# Patient Record
Sex: FEMALE | URBAN - NONMETROPOLITAN AREA
[De-identification: names, ages, dates, MRNs, and addresses within clinical notes are randomized per-mention and may not be internally consistent; named-entity substitution may affect disease eponyms.]

---

## 2021-06-03 ENCOUNTER — HOSPITAL ENCOUNTER (OUTPATIENT)
Age: 41
Setting detail: SPECIMEN
Discharge: HOME | End: 2021-06-03

## 2025-04-28 ENCOUNTER — OFFICE VISIT (OUTPATIENT)
Dept: GYNECOLOGIC ONCOLOGY | Facility: CLINIC | Age: 45
End: 2025-04-28
Payer: MEDICAID

## 2025-04-28 VITALS
DIASTOLIC BLOOD PRESSURE: 87 MMHG | WEIGHT: 206.35 LBS | BODY MASS INDEX: 37.97 KG/M2 | HEIGHT: 62 IN | OXYGEN SATURATION: 97 % | SYSTOLIC BLOOD PRESSURE: 129 MMHG | RESPIRATION RATE: 16 BRPM | TEMPERATURE: 97.5 F | HEART RATE: 78 BPM

## 2025-04-28 DIAGNOSIS — N83.8 OVARIAN MASS: Primary | ICD-10-CM

## 2025-04-28 PROCEDURE — 3008F BODY MASS INDEX DOCD: CPT | Performed by: OBSTETRICS & GYNECOLOGY

## 2025-04-28 PROCEDURE — 99205 OFFICE O/P NEW HI 60 MIN: CPT | Performed by: OBSTETRICS & GYNECOLOGY

## 2025-04-28 PROCEDURE — 99215 OFFICE O/P EST HI 40 MIN: CPT | Performed by: OBSTETRICS & GYNECOLOGY

## 2025-04-28 RX ORDER — BIOTIN 1 MG
1000 TABLET ORAL 2 TIMES DAILY
COMMUNITY

## 2025-04-28 RX ORDER — METOCLOPRAMIDE 5 MG/1
5 TABLET ORAL 2 TIMES DAILY PRN
COMMUNITY
Start: 2025-01-24

## 2025-04-28 RX ORDER — OXYCODONE HYDROCHLORIDE 5 MG/1
5 TABLET ORAL 2 TIMES DAILY PRN
COMMUNITY
Start: 2025-04-17

## 2025-04-28 RX ORDER — SERTRALINE HYDROCHLORIDE 100 MG/1
100 TABLET, FILM COATED ORAL
COMMUNITY
Start: 2025-04-16 | End: 2026-04-16

## 2025-04-28 RX ORDER — LORAZEPAM 0.5 MG/1
0.5 TABLET ORAL
COMMUNITY
Start: 2025-01-24

## 2025-04-28 RX ORDER — GABAPENTIN 600 MG/1
600 TABLET ORAL 3 TIMES DAILY
COMMUNITY
Start: 2024-02-13 | End: 2026-01-28

## 2025-04-28 RX ORDER — ASPIRIN 81 MG/1
81 TABLET ORAL
COMMUNITY
Start: 2024-02-13

## 2025-04-28 ASSESSMENT — PAIN SCALES - GENERAL: PAINLEVEL_OUTOF10: 0-NO PAIN

## 2025-04-28 NOTE — PROGRESS NOTES
Patient ID: Tiesha Saez is a 44 y.o. female.  Referring Physician: No referring provider defined for this encounter.  Primary Care Provider: Shaikh Luli MD      Subjective    Referred by Dr. Suarez (Med onc)    43 yo with gastric cancer presenting with adnexal mass    Daily regimen includes a baby Aspirin daily. Not currently anticoagulated.. Manages abdominal pain with Rx, states it's worsening. She is going to the bathroom normally and eating and drinking normally. Notes she gained weight after she was given a Rx for this and her baseline weight is 180 lb.      CT scan 2025 -no evidence of recurrent gastric cancer.  Uterus is unremarkable/new solid/cystic left adnexal mass measures 8 x 6 x 8 cm.  Images from  -   reviewed and no evidence of adnexal mass identified prior to  scan    Cancer history   - dx with gastric cancer.  Treated with NACT, follow up laparoscopic gastrectomy ().  Complicated post op course, only received 1 cycle of chemo, had jejunostomy for feeding, discharged home on hospice in , then had slow recovery.  GREG since that time.  EGD neg     Ob/gyn  .  x2  Per patient her menses cycles stopped when she was a child.  No prior Hx of abnormal PAP smears.    PMH  Gastroesophageal reflux  Constipation  Gastric cancer as above    PSH  Tubulation  Tonsillectomy  Laparoscopic gastrectomy  IVC filter  Power port    SH  Lives with her  and 21 yo daughter.  Denies tobacco, alcohol or illicit drug use.  Unemployed secondary to neuropathy from previous chemotherapy treatment and she uses a cane for this.    FH  Mother had cancer, a grandparent also had stomach cancer. Great grandmother and uncle also have a Hx of cancer.      Review of Systems   All other systems reviewed and are negative.       Objective   BSA: 2.02 meters squared  /87 (BP Location: Right arm, Patient Position: Sitting, BP Cuff Size: Adult)   Pulse 78   Temp 36.4 °C  "(97.5 °F) (Temporal)   Resp 16   Ht (S) 1.576 m (5' 2.05\")   Wt 93.6 kg (206 lb 5.6 oz)   SpO2 97%   BMI 37.68 kg/m²      Family History[1]    Tiesha Saez  has no history on file for tobacco use.  She  has no history on file for alcohol use.  She  has no history on file for drug use.    Physical Exam  Constitutional:       General: She is not in acute distress.     Appearance: Normal appearance.   Cardiovascular:      Rate and Rhythm: Normal rate and regular rhythm.   Pulmonary:      Effort: Pulmonary effort is normal.      Breath sounds: Normal breath sounds.   Abdominal:      General: Bowel sounds are normal. There is no distension.   Genitourinary:     Comments: Cervix visually wnl.  Uterus is small and mobile.  Firm mass appreciated in the pelvis, overall mobile      Musculoskeletal:      Right forearm: Normal.      Left forearm: Normal.      Right hand: Normal.      Left hand: Normal.      Right lower leg: Normal.      Left lower leg: Normal.      Right foot: Normal.      Left foot: Normal.         Performance Status:  Symptomatic; in bed <50% of the day    Assessment/Plan      Oncology History    No history exists.          Problem List Items Addressed This Visit           ICD-10-CM    Ovarian mass - Primary N83.8    Relevant Orders    Case Request Operating Room: laparoscopic bilateral salpingo-oophorectomy, possible staging (Completed)       Treatment Plans       No treatment plans exist        We reviewed recent CT. discussed differential diagnosis of adnexal mass, including both benign and malignant etiologies.  Discussed possibility of primary ovarian carcinoma versus metastatic spread of previously diagnosed gastric cancer.    Discussed surgical exploration including laparoscopic BSO possible staging.  Risks of surgery including bleeding infection and damage nearby structures were reviewed    We will schedule for laparoscopic BSO.  She will need preadmission testing .  She is a candidate for " same-day discharge       Scribe Attestation  By signing my name below, I, Jesis Ocampo, Glendy[place]andy   attest that this documentation has been prepared under the direction and in the presence of Vaishali Diamond MD.          [1] No family history on file.

## 2025-04-29 ENCOUNTER — SOCIAL WORK (OUTPATIENT)
Dept: HEMATOLOGY/ONCOLOGY | Facility: CLINIC | Age: 45
End: 2025-04-29
Payer: MEDICAID

## 2025-04-29 NOTE — PROGRESS NOTES
Patient was recently seen as a new patient with Dr. Duarte- for consultation of Ovarian mass. Patient identified a distress score of 1 at the visit with contributing factors as : fatigue-  The patient was provided with the below letter along with information on how a  can help, patient was also provided with my contact information. No current social work needs have been identified at the moment but social work remains available should any psychosocial needs arise.      April 29, 2025    Dear Tiesha,      I hope this letter finds you well. As part of [facility]'s commitment to providing comprehensive care, I wanted to introduce myself as the social work on your care team. Our team goal is to support you and your loved ones throughout your care here, addressing not only your medical needs but also your emotional, practical, and social concerns.    My name is Chantelle Machado and I am a licensed social worker working within Dr. Diamond's office.  I am available to assist you in a variety of ways, including:  Emotional Support: Offering a safe space to discuss your feelings and coping strategies.  Resource Navigation: Connecting you with financial assistance programs, local transportation resources, and other community resources.  Family Support: Helping your loved ones understand and navigate the challenges of your diagnosis.  Advanced Care Planning: Assisting with decisions about care preferences and advance directives.    These services are provided at no additional cost to you as part of your care at [facility]. I encourage you to reach out and take advantage of this support whenever needed.    If you have questions or if you would like to set up a time to talk, please feel free to reach out to me at [phone] or email me at [email or web address]    Warm regards,      GERARDO Mclaughlin, [facility]  Oncology Social  [facility]  [phone]

## 2025-05-07 ENCOUNTER — CLINICAL SUPPORT (OUTPATIENT)
Dept: PREADMISSION TESTING | Facility: HOSPITAL | Age: 45
End: 2025-05-07
Payer: MEDICAID

## 2025-05-07 NOTE — CPM/PAT H&P
CPM/PAT Evaluation       Name: Tiesha Saez (Tiesha Saez)  /Age: 1980/44 y.o.     {[facility] Visit Type:04795}      Chief Complaint: ***    HPI    Medical History[1]    Surgical History[2]    Patient  has no history on file for sexual activity.    Family History[3]    Allergies[4]    Tiesha Saez is scheduled for laparoscopic bilateral salpingo-oophorectomy, possible staging - Bilateral  on 25    **Data Input  Prior to Admission medications    Medication Sig Start Date End Date Taking? Authorizing Provider   aspirin 81 mg EC tablet Take 1 tablet (81 mg) by mouth once daily. 24   Historical Provider, MD   biotin 1 mg tablet Take 1 tablet (1,000 mcg) by mouth twice a day.    Historical Provider, MD   gabapentin (Neurontin) 600 mg tablet Take 1 tablet (600 mg) by mouth 3 times a day. 24  Historical Provider, MD   LORazepam (Ativan) 0.5 mg tablet Take 1 tablet (0.5 mg) by mouth. 25   Historical Provider, MD   metoclopramide (Reglan) 5 mg tablet Take 1 tablet (5 mg) by mouth 2 times a day as needed. 25   Historical Provider, MD   multivitamin Complete formula with  (Complete ) 3,000-800 unit-mcg capsule Take 1 capsule by mouth once daily.    Historical Provider, MD   oxyCODONE (Roxicodone) 5 mg immediate release tablet Take 1 tablet (5 mg) by mouth 2 times a day as needed. 25   Historical Provider, MD   sertraline (Zoloft) 100 mg tablet Take 1 tablet (100 mg) by mouth once daily in the morning. Take before meals. 25  Historical Provider, MD MAGALIS BLANCAS Physical Exam     Airway  There were no vitals taken for this visit.    DASI Risk Score    No data to display       Caprini DVT Assessment    No data to display       Modified Frailty Index    No data to display       [id number] Stroke Risk Points  Current as of just now        N/A 0 to 9 Points:      Last Change: N/A          The [id number] risk score (Norris GUILLORY, et al. 2009. © 2010  [employer]) quantifies the risk of stroke for a patient with atrial fibrillation. For patients without atrial fibrillation or under the age of 18 this score appears as N/A. Higher score values generally indicate higher risk of stroke.        This score is not applicable to this patient. Components are not calculated.          Revised Cardiac Risk Index    No data to display       Apfel Simplified Score    No data to display       Risk Analysis Index Results This Encounter    No data found in the last 10 encounters.       Prodigy: High Risk  Total Score: 3              Prodigy Previous Opioid Use Score           ARISCAT Score for Postoperative Pulmonary Complications    No data to display       Pagan Perioperative Risk for Myocardial Infarction or Cardiac Arrest (SIMONE)    No data to display     --Testing/Diagnostic:        - EK24 see care everywhere      - MR Brain: 21  IMPRESSION:  1. Few nonspecific scattered foci of T2 hyperintensity within the  bilateral cerebral hemispheric white matter may reflect nonspecific foci of gliosis, demyelinating lesions, or sequela of infectious or inflammatory etiologies. Favor that these are probably reflective of gliosis and could be related to early findings related to chronic small vessel ischemic changes or could be incidental, correlate clinically.     2. Otherwise, unremarkable MRI brain.     3. Unremarkable appearance of the cervical or thoracic spinal cord, conus medullaris, and the cauda equina nerve roots.     4. Multilevel degenerative changes of the cervical, thoracic, and  lumbar spine as detailed above. There is no striking spinal canal  stenosis and there is no cord compression.     5. Nonspecific edematous signal within the paravertebral musculature in the lumbar spine could be related to recent trauma, among other etiologies, correlate clinically.       - CT Chest: 24  Impression    No acute findings.    Large hiatal  hernia containing stomach as well as uncomplicated small bowel.          - CT A/P: 03/17/25  Impression        1.   No definitive CT evidence of tumor recurrence or metastatic disease within the chest, abdomen or pelvis.    2.  5 mm subpleural nodule right lower lobe.  CT follow-up is recommended to assess for stability.    3.  New solid/cystic left adnexal mass 8.3 x 6.9 x 8.3 cm.  Ovary malignancy cannot BE excluded.    Correlation with tumor markers is recommended.        - Venous duplex DVT: 04/17/21  CONCLUSIONS:  Right Lower Venous: No evidence of acute deep vein thrombus visualized in the right lower extremity.  Left Lower Venous: No evidence of acute deep vein thrombus visualized in the left lower extremity.        Patient Specialist/PCP:                                                                                                           PCP: Berna Ruiz 04/16/25 evaluation of an ovarian mass, pain management, and medication management.   History of stomach cancer status post resection, neuropathy, episodic vision loss, anxiety, migraine, episodic seizure-like episodes history of stomach cancer status post resection, neuropathy, episodic vision loss, anxiety, migraine, episodic seizure-like episodes       GynOnc: Vaishali Diamond 04/28/25 presents with adnexal mass     CT scan March 17, 2025 -no evidence of recurrent gastric cancer.  Uterus is unremarkable/new solid/cystic left adnexal mass measures 8 x 6 x 8 cm.  Images from 2022 -  2024 reviewed and no evidence of adnexal mass identified prior to 2025 scan  -We will schedule for laparoscopic BSO.  She will need preadmission testing .  She is a candidate for same-day discharge.      Neurology: Demetri Jaquez 01/24/24 presents with a difficult to localize constellation of symptoms    presents with normal blood pressure and distractible neurological symptoms. Given her history of GI bleed and the uncertainty as to whether this represents stroke, tPA is  not indicated. There is no evidence of large vessel occlusion.      Defer to ER team regarding further evaluation of port pain.    MRI brain follow-up is reasonable.          ___________________________________________________________  **Data input only. No medications, history or providers verified       Abbie Verduzco LPN  Preadmission Testing        Nurse Plan of Action:  CT [facility]2024 [facility]      Assessment and Plan:     {[facility] ASSESSMENT AND PLAN:86624}             [1]   Past Medical History:  Diagnosis Date    Anxiety     Carpal tunnel syndrome, right upper limb     Carpal tunnel syndrome of right wrist    Chest pain, unspecified     Nonspecific chest pain    Personal history of other endocrine, nutritional and metabolic disease     History of morbid obesity   [2]   Past Surgical History:  Procedure Laterality Date    APPENDECTOMY      CT ABDOMEN PELVIS ANGIOGRAM W AND/OR WO IV CONTRAST  01/15/2021    CT ABDOMEN PELVIS ANGIOGRAM W AND/OR WO IV CONTRAST 1/15/2021 [facility]    FL GUIDED PERCUTANEOUS DUODENOSTOMY TUBE PLACEMENT  04/15/2021    FL GUIDED PERCUTANEOUS DUODENOSTOMY TUBE PLACEMENT 4/15/2021 [facility]    IR CVC TUNNELED  04/30/2021    IR CVC TUNNELED 4/30/2021 [facility]    IR GI GJTUBE PLACEMENT  05/11/2021    IR GI GJTUBE PLACEMENT 5/11/2021 [facility]    IR GI GJTUBE PLACEMENT  07/08/2021    IR GI GJTUBE PLACEMENT 7/8/2021 [facility]    IR  TUBE EXCHANGE  09/13/2021    IR  TUBE EXCHANGE 9/13/2021 [facility]    OTHER SURGICAL HISTORY  09/03/2020    Oral surgery    OTHER SURGICAL HISTORY  09/03/2020    Tympanoplasty    TONSILLECTOMY      TUBAL LIGATION     [3] No family history on file.  [4]   Allergies  Allergen Reactions    Codeine Anaphylaxis, GI Upset and Swelling    Rivaroxaban Anaphylaxis, Bleeding, Other and Unknown     Severe internal bleeding    Severe internal bleeding      Xarelto    Sunflower Oil  Anaphylaxis    Tramadol Anaphylaxis, GI intolerance, GI Upset, Nausea Only and Nausea/vomiting    Wheat Bran Anaphylaxis    Wheat Dextrin Anaphylaxis    Aspirin Swelling and Unknown    Bee Sting Kit Unknown    Bee Venom Protein (Honey Bee) Unknown    Dextroamphetamine Unknown    Dextroamphetamine-Amphetamine Unknown    Latex Swelling     local swelling to place of contact with latex    Mirtazapine Psychosis    Morphine Unknown    Acetaminophen Hives and Rash